# Patient Record
(demographics unavailable — no encounter records)

---

## 2025-06-02 NOTE — PHYSICAL EXAM
[No Acute Distress] : no acute distress [Well Nourished] : well nourished [Well Developed] : well developed [Well-Appearing] : well-appearing [No Respiratory Distress] : no respiratory distress  [Clear to Auscultation] : lungs were clear to auscultation bilaterally [Normal Rate] : normal rate  [Regular Rhythm] : with a regular rhythm [No Joint Swelling] : no joint swelling [No Rash] : no rash [Normal Affect] : the affect was normal [Alert and Oriented x3] : oriented to person, place, and time [Normal Mood] : the mood was normal [Normal Insight/Judgement] : insight and judgment were intact [Normal] : affect was normal and insight and judgment were intact

## 2025-06-02 NOTE — HISTORY OF PRESENT ILLNESS
[FreeTextEntry1] : I have been in [place] for the last 7 months - I got sick and was seen in urgent care in [place]. I was prescribed Promethazine, Benzonatate and Prednisone. I was negative for flu and strep - not Covid. I do not have body aches. My pressure has also been been a little high high.  [de-identified] : Mr. Mercado presents in no immediate distress. He has been on Prednisone 20mg x 5 days and has 2 days left. His repeat BP was 150/80 - manually. He denies headache, dizziness, fever, chills, bodyaches.

## 2025-06-02 NOTE — PLAN
[FreeTextEntry1] : Pt. advised to finish Prednisone. Pt. further advised that BP is not optimal or necessarily correct while on Prednisone. Pt. is already on Lisinopril 20mg po daily. Pt was instructed to take BP in the morning, before coffee and medication x 1 month and then return with readings. At that time medication will be adjusted if necessary. Pt. verbalized understanding and informs he will do so.

## 2025-07-23 NOTE — HEALTH RISK ASSESSMENT
[No falls in past year] : Patient reported no falls in the past year [None] : Patient does not have any barriers to medication adherence [Yes] : Reviewed medication list for presence of high-risk medications. [Fully functional (bathing, dressing, toileting, transferring, walking, feeding)] : Fully functional (bathing, dressing, toileting, transferring, walking, feeding) [Fully functional (using the telephone, shopping, preparing meals, housekeeping, doing laundry, using] : Fully functional and needs no help or supervision to perform IADLs (using the telephone, shopping, preparing meals, housekeeping, doing laundry, using transportation, managing medications and managing finances) [With Patient/Caregiver] : , with patient/caregiver [Reviewed no changes] : Reviewed, no changes [Change in mental status noted] : No change in mental status noted [] :  [AdvancecareDate] : 7/25

## 2025-07-23 NOTE — DISCUSSION/SUMMARY
[FreeTextEntry1] : 62M with HTN DM HLD presents for f/u  Feeling well Euvolemic EKG showing SR No cp or sob will check tte, last was several years ago  Health maintenance -pt with multiple cad risk factors -will check carotid duplex and AAA study, DEISY  f/u after testing 40 min spent on complete encounter

## 2025-07-23 NOTE — ASSESSMENT
[FreeTextEntry1] : check labs endo notes ecg [Vaccines Reviewed] : Immunizations reviewed today. Please see immunization details in the vaccine log within the immunization flowsheet.

## 2025-07-23 NOTE — HISTORY OF PRESENT ILLNESS
[FreeTextEntry1] : 62M with HTN DM HLD presents for f/u  Dr Benjie Rossi  Previously, pt last seen 2022 for an initial eval, feeling well.   pt has not been seen since initial eval 2022. pt now presents for follow up today,  Pt overall feeling well. denies CP, SOB, at rest or on exertion. states occasional palpitations, denies dizziness, diaphoresis, syncope, LE edema, orthopnea.  Prev cardiac history: none  Exercise: golf Diet: none  Previous cardiac testing: calcium score 2019: 205. nuclear stress 2019:" normal perfusion echo 2019: LV EF preserved. echo 2015: LV EF 58%, mild MR, TR, normal RV function  Recent labs: 7/22: hgb 14.7 tsh 0.85 tot chol 141 tg 70 hdl 61 ldl 66 Cr 0.81 gfr 102  EKG: SR RBBB  Med hx: HTN DM HLD Sx hx: ankle, shoulder, sinus Family hx: F: CVA Social hx: lives in [place], also has a place in [place], [place]. retired . no tob/etoh/drugs Meds: lipitor 20 asa 81 Janumet, Jardiance lisinopril 20 omeprazole prn, Viagra prn Allergies: nkda

## 2025-07-23 NOTE — HISTORY OF PRESENT ILLNESS
[FreeTextEntry1] : cpx [de-identified] : home bps [address]'s/[address]'s meds reviewed and confirmed--lantus 35units qhs labs done by endo (Nita) 2 months aog--hba1c 7.4 plans on making f/u appt with cardio--denies cardiac symps utd with optho